# Patient Record
Sex: MALE | Race: WHITE | NOT HISPANIC OR LATINO | Employment: STUDENT | URBAN - METROPOLITAN AREA
[De-identification: names, ages, dates, MRNs, and addresses within clinical notes are randomized per-mention and may not be internally consistent; named-entity substitution may affect disease eponyms.]

---

## 2021-02-17 VITALS
HEART RATE: 79 BPM | DIASTOLIC BLOOD PRESSURE: 79 MMHG | BODY MASS INDEX: 28.04 KG/M2 | HEIGHT: 68 IN | WEIGHT: 185 LBS | SYSTOLIC BLOOD PRESSURE: 130 MMHG

## 2021-02-17 DIAGNOSIS — U07.1 COVID-19 VIRUS INFECTION: Primary | ICD-10-CM

## 2021-02-17 PROCEDURE — 99203 OFFICE O/P NEW LOW 30 MIN: CPT | Performed by: ORTHOPAEDIC SURGERY

## 2021-02-17 NOTE — PROGRESS NOTES
Assessment/Plan:  1  COVID-19 virus infection       I had a detailed discussion with the patient and accompanying parents regarding return to sports guideline following COVID-19 infection  We discussed the risks and benefits of sports participation following COVID-19 infection  This included the potential risks of myocarditis, multi system inflammatory syndrome in children and sudden cardiac death  On the contrary, there are multiple benefits of sports participation for general health, physical and psychological fitness  Hence, the medical decision in this regard is based on the balance of individual risks and benefits of the patient as known at the time of this office visit  I explained that the scientific evidence in this regard is limited and evolving  Hence, any medical eligibility for return to sports may be rescinded if any concerning symptoms develop subsequent to this office visit or during the return to play protocol  The patient was emphasized the need to correctly report any and all symptoms which develop during return to play protocol to school/college  or supervising healthcare professional     Based on my current clinical evaluation, further investigations are not suggested before starting the gradual return to play protocol  Subjective:   Bernardo Johnson is a 23 y o  male who presents  For clearance for recent COVID infection and clearance to begin return to play protocol at college  He is asymptomatic today      School / College:  [email or web address]    Sports:  [employer]  Date of positive test:  1/27/21    Was patient symptomatic ?: yes    Date of symptom onset, if any: 1/26/21    Duration of symptoms: 4-5 days    Has the patient now been asymptomatic for more than 24 hours without need of fever reducing medication? : yes      Patient's COVID-19 related symptoms:    Mild  -  X    (Mild symptoms of COVID-19 illness include self-limited fatigue, loss of taste or smell, nausea/vomiting/diarrhea, cough and sore throat, mild headache, nasopharyngeal congestion)    Moderate     (Moderate symptoms of COVID-19 include persistent fever of >100 3 degree F for > 72 hours, severe myalgia or lethargy, chest tightness, pre-syncope or brief syncopal episode, palpitations, shortness of breath at rest or with exertion)    Severe      (Severe symptoms of COVID-19 include syncopal episode, need for hospitalization or oxygen support, need for intensive care admission)    Did patient need hospital admission?: no    Did patient need intubation or require intensive care admission ?: no    Pre-existing cardiovascular conditions: no    Pre-existing uncontrolled or severe pulmonary conditions: no    Current Cardio-pulmonary Screen:    Passed out or nearly passed out DURING exercise: no  Passed out or nearly passed out AFTER exercise: no  Chest pain, discomfort or pressure in chest with exercise or activities of daily living: no  Cough or shortness of breath with activities of daily living: no  Feeling of heart racing or skipping beats during exercise or activities of daily living: no  Has a doctor ever told that you have a heart murmur, heart infection, high blood pressure or high cholesterol: no  Has a doctor ever ordered a test for your heart?(e g  ECG, Echocardiogram): no  Does anyone in the family have a heart problem: unknown  Has anyone in patient's family  for no apparent reason: unknown  Has any family member or relative been severely disabled or  of heart problems or sudden death before age 48: unknown  Does anyone in family have Marfan syndrome: unknown    Review of Systems   Constitutional: Negative for chills, fever and unexpected weight change  HENT: Negative for hearing loss, nosebleeds and sore throat  Eyes: Negative for pain, redness and visual disturbance  Respiratory: Negative for cough, shortness of breath and wheezing      Cardiovascular: Negative for chest pain, palpitations and leg swelling  Gastrointestinal: Negative for abdominal pain, nausea and vomiting  Endocrine: Negative for polyphagia and polyuria  Genitourinary: Negative for dysuria and hematuria  Musculoskeletal:        See HPI   Skin: Negative for rash and wound  Neurological: Negative for dizziness, numbness and headaches  Psychiatric/Behavioral: Negative for decreased concentration and suicidal ideas  The patient is not nervous/anxious  History reviewed  No pertinent past medical history  History reviewed  No pertinent surgical history  Family History   Adopted: Yes       Social History     Occupational History    Not on file   Tobacco Use    Smoking status: Never Smoker    Smokeless tobacco: Never Used   Substance and Sexual Activity    Alcohol use: Never     Frequency: Never    Drug use: Never    Sexual activity: Not on file       No current outpatient medications on file  No Known Allergies    Objective:  Vitals:    02/17/21 1322   BP: 130/79   Pulse: 79       Ortho Exam    Physical Exam  Vitals signs reviewed  Constitutional:       Appearance: He is well-developed  HENT:      Head: Normocephalic and atraumatic  Eyes:      Conjunctiva/sclera: Conjunctivae normal       Pupils: Pupils are equal, round, and reactive to light  Neck:      Musculoskeletal: Normal range of motion and neck supple  Cardiovascular:      Rate and Rhythm: Normal rate and regular rhythm  Pulses: Normal pulses  Heart sounds: Normal heart sounds  No murmur  Pulmonary:      Effort: Pulmonary effort is normal  No respiratory distress  Breath sounds: Normal breath sounds  No wheezing, rhonchi or rales  Musculoskeletal:      Comments: As noted in HPI   Skin:     General: Skin is warm and dry  Neurological:      Mental Status: He is alert and oriented to person, place, and time     Psychiatric:         Behavior: Behavior normal